# Patient Record
(demographics unavailable — no encounter records)

---

## 2024-12-18 NOTE — HISTORY OF PRESENT ILLNESS
[de-identified] : MARYLIN PHILLIPS  90 year old female presents for initial evaluation of left knee pain ongoing for 3-4 years. She is s/p right TKR done in 2017. Her right knee is doing well. In regard to her left knee, she has daily pain. She describes the pain is along the medial aspect with swelling and buckling. She endorses pain with startup, stairs and ambulation for more than a block. Denies using assistive devices. She takes Tylenol for pain control. She has had multiple left knee baker cyst aspirations with the last done November 25th. PMHx of HTN, arrythmias, A-fib on blood thinner. Past surgical hx of right TKR and hysterectomy.

## 2024-12-18 NOTE — PHYSICAL EXAM
[de-identified] : General appearance: well-nourished and hydrated, pleasant, alert and oriented x 3, cooperative. HEENT: Normocephalic, EOM intact, Nasal septum midline, Oral cavity clear, External auditory canal clear. Cardiovascular: no apparent abnormalities, lower leg edema, no varicosities, pedal pulses are palpable. Lymphatics Lymph nodes: none palpated, Lymphedema: not present. Neurologic: sensation is normal, no muscle weakness in upper or lower extremities, patella tendon reflexes intact. Dermatologic no apparent skin lesions, moist, warm, no rash. Spine: cervical spine appears normal and moves freely, thoracic spine appears normal and moves freely, lumbosacral spine appears normal and moves freely. Gait: nonantalgic.   Left knee Inspection: mild effusion. Popliteal cyst Wounds: none. Alignment: 5 degrees valgus. Palpation: medial and lateral tenderness on palpation. ROM active (in degrees): 0-115 with pain and crepitus Ligamentous laxity: all ligaments appear stable,, negative ant. drawer test, negative post. drawer test, stable to varus stress test, stable to valgus stress test. negative Lachman's test, negative pivot shift test Meniscal Test: negative McMurrays, negative Jb. Patellofemoral Alignment Test: Q angle-, normal. Muscle Test: good quad strength. Leg examination: calf is soft and non-tender.   Right knee Inspection: no effusion or erythema. Wounds: well healed midline incision. Alignment: normal. Palpation: no specific tenderness on palpation. ROM active (in degrees): 0-120 Ligamentous laxity: all ligaments appear stable, negative ant. drawer test, negative post. drawer test, stable to varus stress test, stable to valgus stress test. negative Lachman's test, negative pivot shift test Meniscal Test: negative McMurrays, negative Jb. Patellofemoral Alignment Test: Q angle-, normal. Muscle Test: good quad strength. Leg examination: calf soft and non tender.   Left hip Inspection: No swelling or ecchymosis. Wounds: none. Palpation: non-tender. Stability: no instability. Strength: 5/5 all motor groups. ROM: no pain with FROM. Leg length: equal.   Right hip Inspection: No swelling or ecchymosis. Wounds: none. Palpation: non-tender. Stability: no instability. Strength: 5/5 all motor groups. ROM: no pain with FROM. Leg length: equal.   Left ankle Inspection: mild swelling noted. Palpation: no pain on palpation. ROM: FROM without crepitus. Muscle strength: 5/5. Stability: no instability noted.   Right ankle Inspection: mild swelling noted. ROM: FROM without crepitus. Palpation: no pain on palpation. Muscle strength: 5/5. Stability: no instability noted.   Left foot Inspection: pes planus and hallux valgus ROM: full range of motion of all joints without pain or crepitus. Palpation: no tenderness. Stability: no instability noted.   Right foot Inspection: pes planus and hallux valgus ROM: full range of motion of all joints without pain or crepitus. Palpation: no tenderness. Stability: no instability noted.   Left shoulder Inspection: no muscle asymmetry, no atrophy. Palpation: no tenderness noted, ACJ non-tender. ROM: full active ROM, full passive ROM. Strength testing): anterior deltoid, supraspinatus, infraspinatus, subscapularis all 5/5. Stability test: ant. apprehension negative, post. apprehension negative, relocation test negative. Impingement Test: negative NEER.   Right shoulder Inspection: no muscle asymmetry, no atrophy. Palpation: no tenderness noted, ACJ non-tender. ROM: full active ROM, full passive ROM. Strength testing): anterior deltoid, supraspinatus, infraspinatus, subscapularis all 5/5. Stability test: ant. apprehension negative, post. apprehension negative, relocation test negative. Impingement Test: negative NEER. Surgical Wounds: none.   Left elbow Inspection: negative swelling. Wounds: none. Palpation: non-tender. ROM: full ROM. Strength: 5/5 all groups. Stability: no instability. Mass: none.   Right elbow Inspection: negative swelling. Wounds: none. Palpation: non-tender. ROM: full ROM. Strength: 5/5 all groups. Stability: no instability. Mass: none.   Left wrist Inspection: negative swelling. Wound: none. Palpation (bone): no tenderness. ROM: full ROM. Strength: full , good.   Right wrist Inspection: negative swelling. Wound: none. Palpation (bone): no tenderness. ROM: full ROM. Strength: full , good.   Left hand Inspection: no skin changes, normal appearance. Wounds: none. Strength: full , able to make full fist. Sensation: light touch intact all fingers and thumb. Vascular: good capillary refill < 3 seconds, all fingers and thumb. Mass: none.   Right hand Inspection: no skin changes, normal appearance. Wounds: none. Strength: full , able to make full fist. Sensation: light touch intact all fingers and thumb. Vascular: good capillary refill < 3 seconds, all fingers and thumb. Mass: none. [de-identified] : Right knee x-ray, AP, lateral, merchant view taken at the office today demonstrates a total knee replacement in satisfactory position and alignment. No evidence of loosening. The patella sits in a central position.  Left knee x-rays, standing AP/Lateral and Merchant films, and 45-degree PA standing view, taken at the office today shows diffuse tricompartmental degenerative arthritis, lateral joint space narrowing, marginal osteophytes, mild valgus deformity, bone on bone sclerosis, patella at appropriate height and central position, patellofemoral joint space narrowing, peripheral osteophytes, Kellgren and Otto grade 4.

## 2024-12-18 NOTE — DISCUSSION/SUMMARY
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 Discussed at length the natural history of degenerative arthritis and reviewed non-operative and operative treatment. Prior non-operative treatment for more than 3 months by either myself or prior treating physicians, including NSAIDs, injection therapy, a structured exercise program or physiotherapy and weight management has not resolved the condition. Due to the pain and associated functional disability that impacts all appropriate activities of daily living, I recommend a LEFT total knee replacement. A thorough discussion regarding the risks, benefits, convalescence and alternatives were reviewed. The risks can include but are not limited to anesthesia risk, bleeding wit possible transfusion, nerve injury, infection, revision surgery due to implant failure, bone fracture, deep vein thrombosis, cardiac failure, pneumonia, and respiratory distress. The patient's expectations were reviewed and compared to the surgeon's expectations. This allowed for a discussion regarding reasonable expectations for functional improvement, pain relief, and return to normal activities of daily living. Numerous questions were asked and answered to their satisfaction. The patient was involved in the decision-making process - we discussed implant choice and fixation along with all details of TKA. Models were used as an educational tool. Surgery will be scheduled at a convenient time. We did discuss correction of the valgus deformity and possible peroneal nerve palsy with numbness and foot and ankle weakness, which tends to be temporary, if develops may need ankle brace for walking. The patient does have a popliteal cyst and had multiple aspirations in the past.        Surgery will be scheduled at a convenient time.       Preop medical and cardiac clearance.   Regarding her right TKR, she is doing well. I reviewed x-rays with them and compared to prior films. I have reassured them that their implants are functioning well. All questions answered, understanding verbalized. KOOS Jr and PROMIS scores obtained. This was explained in the presence of their daughter.

## 2024-12-18 NOTE — ADDENDUM
[FreeTextEntry1] : This note was written by Rene Sanchez on 12/18/2024 acting as scribe for Dr. Vladimir Garcia M.D.   I, Dr. Vladimir Garcia, have read and attest that all the information, medical decision making and discharge instructions within are true and accurate.

## 2025-03-28 NOTE — HISTORY OF PRESENT ILLNESS
[Clean/Dry/Intact] : clean, dry and intact [Healed] : healed [Swelling] : swollen [Neuro Intact] : an unremarkable neurological exam [Vascular Intact] : ~T peripheral vascular exam normal [Negative Pablo's] : maneuvers demonstrated a negative Pablo's sign [Doing Well] : is doing well [No Sign of Infection] : is showing no signs of infection [Adequate Pain Control] : has adequate pain control [Staples Removed] : staples were removed [Chills] : no chills [Constipation] : no constipation [Diarrhea] : no diarrhea [Dysuria] : no dysuria [Fever] : no fever [Nausea] : no nausea [Vomiting] : no vomiting [Erythema] : not erythematous [Discharge] : absent of discharge [Dehiscence] : not dehisced [de-identified] : Post op visit [de-identified] : Patient presents today for F/U S/P Left TKR done 3 weeks ago. Patient is doing well and undergoing P.T. with improvement. Takes pain meds and DVT prophylaxis. I went over post-op care and answered all questions. I also provided patient with surgical report for their records. [de-identified] : ROM 0-105 degrees [de-identified] : Continue P.T. pain management and DVT prophylaxis. F/U in 1 month with x-rays.

## 2025-03-28 NOTE — PROCEDURE
[de-identified] : Observation on incision dry, clean, intact, well healed. Method staple removing kit. Incision site Cleaned with iodine swab after staples are completely removed. Instructions Keep incision dry and clean, allowed to shower and pat site dry, do not rub dry, contact office is site becomes red, swollen, infected, or you develop a fever.

## 2025-03-28 NOTE — HISTORY OF PRESENT ILLNESS
[Clean/Dry/Intact] : clean, dry and intact [Healed] : healed [Swelling] : swollen [Neuro Intact] : an unremarkable neurological exam [Vascular Intact] : ~T peripheral vascular exam normal [Negative Pablo's] : maneuvers demonstrated a negative Pablo's sign [Doing Well] : is doing well [No Sign of Infection] : is showing no signs of infection [Adequate Pain Control] : has adequate pain control [Staples Removed] : staples were removed [Chills] : no chills [Constipation] : no constipation [Diarrhea] : no diarrhea [Dysuria] : no dysuria [Fever] : no fever [Nausea] : no nausea [Vomiting] : no vomiting [Erythema] : not erythematous [Discharge] : absent of discharge [Dehiscence] : not dehisced [de-identified] : Post op visit [de-identified] : Patient presents today for F/U S/P Left TKR done 3 weeks ago. Patient is doing well and undergoing P.T. with improvement. Takes pain meds and DVT prophylaxis. I went over post-op care and answered all questions. I also provided patient with surgical report for their records. [de-identified] : ROM 0-105 degrees [de-identified] : Continue P.T. pain management and DVT prophylaxis. F/U in 1 month with x-rays.

## 2025-03-28 NOTE — PROCEDURE
[de-identified] : Observation on incision dry, clean, intact, well healed. Method staple removing kit. Incision site Cleaned with iodine swab after staples are completely removed. Instructions Keep incision dry and clean, allowed to shower and pat site dry, do not rub dry, contact office is site becomes red, swollen, infected, or you develop a fever.

## 2025-04-14 NOTE — DISCUSSION/SUMMARY
[de-identified] : Patient is doing well and making progress following their s/p Left TKR. She does have left lower leg edema and is on Eliquis for A-fib. She will be seeing her cardiologist later today for further evaluation. I reviewed x-rays with them and compared to prior films. I have reassured them that their implants are functioning well. All questions answered, understanding verbalized. This was explained in the presence of their daughter.    She is encouraged to continue to stay active with PT. They can continue activities as tolerated.   I will see them back in 6-8 weeks.

## 2025-04-14 NOTE — PHYSICAL EXAM
[de-identified] : General appearance: well-nourished and hydrated, pleasant, alert and oriented x 3, cooperative. HEENT: Normocephalic, EOM intact, Nasal septum midline, Oral cavity clear, External auditory canal clear. Cardiovascular: no apparent abnormalities, lower leg edema, no varicosities, pedal pulses are palpable. Lymphatics Lymph nodes: none palpated, Lymphedema: not present. Neurologic: sensation is normal, no muscle weakness in upper or lower extremities, patella tendon reflexes intact. Dermatologic no apparent skin lesions, moist, warm, no rash. Spine: cervical spine appears normal and moves freely, thoracic spine appears normal and moves freely, lumbosacral spine appears normal and moves freely. Gait: nonantalgic.   Left Knee Inspection: mild diffused soft tissue swelling Wounds: healed midline incision  Alignment: normal. Palpation: no specific tenderness on palpation. ROM: Active (in degrees): 0-120 with no instability Ligamentous laxity (neg): negative ant. drawer test, negative post. drawer test, stable to varus stress test, stable to valgus stress test, Patellofemoral Alignment Test: Q angle-, normal. Muscle Test: good quad strength. Leg examination: calf is soft and non-tender.  [de-identified] : Left knee x-ray, AP, lateral, merchant view taken at the office today demonstrates a total knee replacement in satisfactory position and alignment. No evidence of loosening. The patella sits in a central position.  Right knee x-ray merchant view taken at the office today demonstrates a total knee replacement with the patella in a central position.

## 2025-04-14 NOTE — ADDENDUM
[FreeTextEntry1] : This note was written by Rene Sanchez on 04/14/2025 acting as scribe for Dr. Vladimir Garcia M.D.   I, Dr. Vladimir Garcia, have read and attest that all the information, medical decision making and discharge instructions within are true and accurate.

## 2025-04-14 NOTE — HISTORY OF PRESENT ILLNESS
[de-identified] : MARYLIN PHILLIPS  90 year old female presents for evaluation of 6 weeks s/p left TKR and going to PT 2x a week. Denies much pain but takes Tylenol as needed. She noted a lot of swelling and concerned about the baker cyst is still there. She is on Eliquis for A-fib and is unable to take NSAIDs. Pt states she has been feeling tired and will be going to [place] after her visit with us today as she had previous ablation in the past. She notes getting in contact with her cardiologist has been difficulty. She does have a watchman chip in place which monitors her A-fib.

## 2025-06-09 NOTE — DISCUSSION/SUMMARY
[de-identified] : Patient is overall doing well following their s/p Bilateral TKRs, left now at 3 months. She is more encumbered by the baker cyst for which she had a prior aspiration as noted above. I reviewed x-rays with them and compared to prior films. I have reassured them that their implants are functioning well, and the baker cyst is not causing a functional impairment. I did suggest heat in the morning and ice at night.  All questions answered, understanding verbalized. KOOS Jr and PROMIS scores obtained and reviewed. This was explained in the presence of their daughter.    She is encouraged to continue to stay active with a home exercise program.   I will see them back in 3 months with x-rays.

## 2025-06-09 NOTE — PHYSICAL EXAM
[de-identified] : General appearance: well-nourished and hydrated, pleasant, alert and oriented x 3, cooperative. HEENT: Normocephalic, EOM intact, Nasal septum midline, Oral cavity clear, External auditory canal clear. Cardiovascular: no apparent abnormalities, lower leg edema, no varicosities, pedal pulses are palpable. Lymphatics Lymph nodes: none palpated, Lymphedema: not present. Neurologic: sensation is normal, no muscle weakness in upper or lower extremities, patella tendon reflexes intact. Dermatologic no apparent skin lesions, moist, warm, no rash. Spine: cervical spine appears normal and moves freely, thoracic spine appears normal and moves freely, lumbosacral spine appears normal and moves freely. Gait: nonantalgic.   Left Knee Inspection: No effusion. Small popliteal cyst noted.  Wounds: healed midline incision  Alignment: normal. Palpation: no specific tenderness on palpation. ROM: Active (in degrees): 0-130 with no instability Ligamentous laxity (neg): negative ant. drawer test, negative post. drawer test, stable to varus stress test, stable to valgus stress test, Patellofemoral Alignment Test: Q angle-, normal. Muscle Test: good quad strength. Leg examination: calf is soft and non-tender.  Right Knee Inspection: No effusion. Wounds: healed midline incision  Alignment: normal. Palpation: no specific tenderness on palpation. ROM: Active (in degrees): 0-130 with no instability Ligamentous laxity (neg): negative ant. drawer test, negative post. drawer test, stable to varus stress test, stable to valgus stress test, Patellofemoral Alignment Test: Q angle-, normal. Muscle Test: good quad strength. Leg examination: calf is soft and non-tender.  [de-identified] : Left knee x-ray, AP, lateral, merchant view taken at the office today demonstrates a total knee replacement in satisfactory position and alignment. No evidence of loosening. The patella sits in a central position.  Right knee x-ray, AP, lateral, merchant view taken at the office today demonstrates a total knee replacement in satisfactory position and alignment. No evidence of loosening. The patella sits in a central position.

## 2025-06-09 NOTE — HISTORY OF PRESENT ILLNESS
[de-identified] : MARYLIN PHILLIPS  91 year old female presents for evaluation of 3 months s/p left TKR and 7 years s/p right TKR. She is no longer attending Pt as she states it has made her tired and causes swelling, so she is doing her own home exercises. She does have a left knee baker cyst and has had an aspiration in May which helped slightly. She still has pain at the posterior aspect. She is on Eliquis so she takes Tylenol as she can't take NSAIDs. Regarding her right knee, she is having occasional pain. At her last visit she was advised follow up with cardiologist due for her lower leg edema but states the cardiologist didn't comment on that.

## 2025-06-09 NOTE — ADDENDUM
[FreeTextEntry1] : This note was written by Rene Sanchez on 06/09/2025 acting as scribe for Dr. Vladimir Garcia M.D.   I, Dr. Vladimir Garcia, have read and attest that all the information, medical decision making and discharge instructions within are true and accurate.